# Patient Record
Sex: FEMALE | Race: BLACK OR AFRICAN AMERICAN | Employment: STUDENT | URBAN - METROPOLITAN AREA
[De-identification: names, ages, dates, MRNs, and addresses within clinical notes are randomized per-mention and may not be internally consistent; named-entity substitution may affect disease eponyms.]

---
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
## 2023-01-02 ENCOUNTER — HOSPITAL ENCOUNTER (EMERGENCY)
Age: 18
Discharge: HOME | End: 2023-01-02
Payer: COMMERCIAL

## 2023-01-02 VITALS
BODY MASS INDEX: 18.73 KG/M2 | HEART RATE: 81 BPM | WEIGHT: 112.4 LBS | RESPIRATION RATE: 20 BRPM | TEMPERATURE: 98.2 F | HEIGHT: 65 IN | OXYGEN SATURATION: 100 % | DIASTOLIC BLOOD PRESSURE: 55 MMHG | SYSTOLIC BLOOD PRESSURE: 99 MMHG

## 2023-01-02 DIAGNOSIS — Z32.02 ENCOUNTER FOR PREGNANCY TEST WITH RESULT NEGATIVE: Primary | ICD-10-CM

## 2023-01-02 DIAGNOSIS — N76.0 BACTERIAL VAGINOSIS: ICD-10-CM

## 2023-01-02 DIAGNOSIS — B37.31 VAGINAL YEAST INFECTION: ICD-10-CM

## 2023-01-02 DIAGNOSIS — B96.89 BACTERIAL VAGINOSIS: ICD-10-CM

## 2023-01-02 LAB
BACTERIA WET PREP: ABNORMAL
BACTERIA: ABNORMAL /HPF
BILIRUBIN URINE: NEGATIVE
BLOOD, URINE: NEGATIVE
CLARITY: ABNORMAL
CLUE CELLS: ABNORMAL
COLOR: YELLOW
EPITHELIAL CELLS WET PREP: ABNORMAL
EPITHELIAL CELLS, UA: ABNORMAL /HPF (ref 0–5)
GLUCOSE URINE: NEGATIVE MG/DL
HCG(URINE) PREGNANCY TEST: NEGATIVE
KETONES, URINE: ABNORMAL MG/DL
LEUKOCYTE ESTERASE, URINE: ABNORMAL
MICROSCOPIC EXAMINATION: YES
NITRITE, URINE: NEGATIVE
PH UA: 8 (ref 5–8)
PROTEIN UA: NEGATIVE MG/DL
RBC UA: ABNORMAL /HPF (ref 0–4)
RBC WET PREP: ABNORMAL
SOURCE WET PREP: ABNORMAL
SPECIFIC GRAVITY UA: 1.02 (ref 1–1.03)
TRICHOMONAS PREP: ABNORMAL
URINE REFLEX TO CULTURE: ABNORMAL
URINE TYPE: ABNORMAL
UROBILINOGEN, URINE: 0.2 E.U./DL
WBC UA: ABNORMAL /HPF (ref 0–5)
WBC WET PREP: ABNORMAL
YEAST WET PREP: ABNORMAL
YEAST: PRESENT /HPF

## 2023-01-02 PROCEDURE — 87491 CHLMYD TRACH DNA AMP PROBE: CPT

## 2023-01-02 PROCEDURE — 87210 SMEAR WET MOUNT SALINE/INK: CPT

## 2023-01-02 PROCEDURE — 84703 CHORIONIC GONADOTROPIN ASSAY: CPT

## 2023-01-02 PROCEDURE — 99283 EMERGENCY DEPT VISIT LOW MDM: CPT

## 2023-01-02 PROCEDURE — 87591 N.GONORRHOEAE DNA AMP PROB: CPT

## 2023-01-02 PROCEDURE — 81001 URINALYSIS AUTO W/SCOPE: CPT

## 2023-01-02 RX ORDER — METRONIDAZOLE 500 MG/1
500 TABLET ORAL 2 TIMES DAILY
Qty: 14 TABLET | Refills: 0 | Status: SHIPPED | OUTPATIENT
Start: 2023-01-02 | End: 2023-01-09

## 2023-01-02 RX ORDER — FLUCONAZOLE 150 MG/1
150 TABLET ORAL ONCE
Qty: 1 TABLET | Refills: 0 | Status: SHIPPED | OUTPATIENT
Start: 2023-01-02 | End: 2023-01-02

## 2023-01-02 ASSESSMENT — ENCOUNTER SYMPTOMS
VOMITING: 0
VOICE CHANGE: 0
ABDOMINAL PAIN: 0
CHEST TIGHTNESS: 0
COUGH: 0
WHEEZING: 0
BACK PAIN: 0
SORE THROAT: 0
NAUSEA: 0
SHORTNESS OF BREATH: 0

## 2023-01-02 NOTE — ED PROVIDER NOTES
[id number] [address] ENCOUNTER        Patient Name: Kym Mariano  MRN: [id number]  [place] 2005  Date of evaluation: 1/2/2023  Provider: Zarina Mays MD  PCP: No primary care provider on file. Note Started: 6:39 PM EST 1/2/23    CHIEF COMPLAINT       Wants pregnancy test      HISTORY OF PRESENT ILLNESS: 1 or more Elements     History from : Patient    Limitations to history : None    Kym Mariano is a 21 y.o. female who presents to the emergency department today reporting concern for possible pregnancy. Patient states that she did not have a menstrual cycle in December and has had intermittent cramp-like sensations in her lower abdomen. Nursing Notes were all reviewed and agreed with or any disagreements were addressed in the HPI. REVIEW OF SYSTEMS :      Review of Systems   Constitutional:  Negative for chills, fatigue and fever. HENT:  Negative for congestion, sore throat and voice change. Eyes:  Negative for visual disturbance. Respiratory:  Negative for cough, chest tightness, shortness of breath and wheezing. Cardiovascular:  Negative for chest pain and palpitations. Gastrointestinal:  Negative for abdominal pain, nausea and vomiting. Endocrine: Negative for polydipsia and polyuria. Genitourinary:  Negative for difficulty urinating, dysuria and flank pain. Musculoskeletal:  Negative for back pain, neck pain and neck stiffness. Skin:  Negative for rash and wound. Allergic/Immunologic: Negative for immunocompromised state. Neurological:  Negative for dizziness, weakness and light-headedness. Hematological:  Does not bruise/bleed easily. Psychiatric/Behavioral:  Negative for sleep disturbance and suicidal ideas. Positives and Pertinent negatives as per HPI. SURGICAL HISTORY   History reviewed. No pertinent surgical history.     [address]       Discharge Medication List as of 1/2/2023  8:27 PM CONTINUE these medications which have NOT CHANGED    Details   ondansetron (ZOFRAN ODT) 4 MG disintegrating tablet Take 1 tablet by mouth every 8 hours as needed for Nausea or Vomiting May Sub regular tablet (non-ODT) if insurance does not cover ODT., Disp-12 tablet, R-1Normal             ALLERGIES     Patient has no known allergies. FAMILYHISTORY     History reviewed. No pertinent family history. SOCIAL HISTORY       Social History     Tobacco Use    Smoking status: Never    Smokeless tobacco: Never   Substance Use Topics    Alcohol use: Not Currently    Drug use: Not Currently       SCREENINGS        [place] Coma Scale  Eye Opening: Spontaneous  Best Verbal Response: Oriented  Best Motor Response: Obeys commands  [place] Coma Scale Score: 15                CIWA Assessment  BP: (!) 99/55  Heart Rate: 81           PHYSICAL EXAM  1 or more Elements     ED Triage Vitals [01/02/23 1807]   BP Temp Temp Source Heart Rate Resp SpO2 Height Weight - Scale   (!) 99/55 98.2 °F (36.8 °C) Oral 81 20 100 % 5' 5\" (1.651 m) 112 lb 6.4 oz (51 kg)       General: No acute distress. Alert and Oriented. Appears stated age. HEENT: No midline cervical spine tenderness. Full ROM of the neck. There is no significant cervical lympadenopathy. No difficulty tolerating oral secretions. Cardiac: Regular rate and rhythm. Radial pulses are intact bilaterally. Chest: No respiratory distress. Clear breath sounds bilaterally. No increased work of breathing. No use of accessory muscles for respiration. Abdomen: Soft, nontender, nondistended, non-peritonitic. Extremities:No significant lower extremity edema. Lower extremities are symmetric. Neuro: Moving all extremities. No focal deficits. Speech is clear. Skin:No rash, no erythema  Psych: Calm and cooperative.       DIAGNOSTIC RESULTS   LABS:    Labs Reviewed   WET PREP, GENITAL - Abnormal; Notable for the following components:       Result Value    Yeast, Wet Prep 2+ (*) All other components within normal limits   URINALYSIS WITH REFLEX TO CULTURE - Abnormal; Notable for the following components:    Clarity, UA SL CLOUDY (*)     Ketones, Urine TRACE (*)     Leukocyte Esterase, Urine TRACE (*)     All other components within normal limits   MICROSCOPIC URINALYSIS - Abnormal; Notable for the following components:    WBC, UA 6-9 (*)     Epithelial Cells, UA 6-10 (*)     Bacteria, UA 2+ (*)     Yeast, UA Present (*)     All other components within normal limits   C.TRACHOMATIS N.GONORRHOEAE DNA   PREGNANCY, URINE       When ordered only abnormal lab results are displayed. All other labs were within normal range or not returned as of this dictation. RADIOLOGY:   Non-plain film images such as CT, Ultrasound and MRI are read by the radiologist. Plain radiographic images are visualized and preliminarily interpreted by the ED Provider with the below findings:        Interpretation per the Radiologist below, if available at the time of this note:    No orders to display     No results found. Bedside Ultrasound, as interpreted by me, if performed:    No results found. PROCEDURES     Unless otherwise noted below, none     Procedures    CRITICAL CARE TIME   None    PAST MEDICAL HISTORY      has no past medical history on file. EMERGENCY DEPARTMENT COURSE and DIFFERENTIAL DIAGNOSIS/MDM:     Vitals:    Vitals:    01/02/23 1807   BP: (!) 99/55   Pulse: 81   Resp: 20   Temp: 98.2 °F (36.8 °C)   TempSrc: Oral   SpO2: 100%   Weight: 112 lb 6.4 oz (51 kg)   Height: 5' 5\" (1.651 m)       Patient was treated with and given the following medications:  Medications - No data to display          Is this patient to be included in the SEP-1 Core Measure due to severe sepsis or septic shock? No   Exclusion criteria - the patient is NOT to be included for SEP-1 Core Measure due to: Infection is not suspected    CC/HPI Summary, DDx, ED Course, and Reassessment:      This is a 64-year-old otherwise healthy female who presents to the emergency department today requesting pregnancy test.  Patient states that she did not have a menses in December and is concerned for possible pregnancy. She states that typically her cycles are regular. She states that she has had intermittent lower abdominal cramping as if she is going to start her menses. She denies having any abdominal pain at present. She denies any back pain. She reports clear vaginal discharge. She denies having any genital sores or lesions. She denies urinary frequency, dysuria, or hematuria. Physical exam complete. Patient arrives nontoxic, afebrile, normotensive. No signs or symptoms of acute distress noted. Her abdomen is soft and nontender. Pregnancy screen is negative. Urinalysis is negative for infection. Wet prep shows yeast and clue cells consistent with bacterial vaginosis. GC chlamydia cultures are pending. Patient agrees to follow-up on these results via [employer]. Patient is counseled on the importance of states that this practices. At this time there is no evidence of any life-threatening or emergent conditions requiring immediate intervention. Low suspicion for sepsis, PID, ovarian torsion, UTI, or other acute pathology. Patient will be discharged with emphasis on close outpatient follow-up. PCP and GYN referrals provided. Shared decision making employed and patient is agreeable to discharge. She agrees return for high fever, incessant vomiting, severe pain, any other worsening symptoms. She is discharged in stable condition. CONSULTS: (Who and What was discussed)  None              I am the Primary Clinician of Record. FINAL IMPRESSION      1. Encounter for pregnancy test with result negative    2. Bacterial vaginosis    3.  Vaginal yeast infection          DISPOSITION/PLAN     DISPOSITION Decision To Discharge 01/02/2023 08:09:26 PM      PATIENT REFERRED TO:  [facility]  [phone]  In 2 days  to establish primary care    DISCHARGE MEDICATIONS:  Patient was given scripts for the following medications. I counseled patient how to take these medications:  Discharge Medication List as of 1/2/2023  8:27 PM        START taking these medications    Details   fluconazole (DIFLUCAN) 150 MG tablet Take 1 tablet by mouth once for 1 dose, Disp-1 tablet, R-0Print      metroNIDAZOLE (FLAGYL) 500 MG tablet Take 1 tablet by mouth 2 times daily for 7 days, Disp-14 tablet, R-0Print             DISCONTINUED MEDICATIONS:  Discharge Medication List as of 1/2/2023  8:27 PM                 (This chart was generated in part by using Dragon Dictation system and may contain errors related to that system including errors in grammar, punctuation, and spelling, as well as words and phrases that may be inappropriate.  If there are any questions or concerns please feel free to contact the dictating provider for clarification.)    MD Cindy Christie, APRN - CNP  01/10/23 [id number]

## 2023-01-03 LAB
C TRACH DNA GENITAL QL NAA+PROBE: NEGATIVE
N. GONORRHOEAE DNA: NEGATIVE

## 2023-01-03 NOTE — DISCHARGE INSTRUCTIONS
No sex for the next 14 days    Follow-up on your GC chlamydia results via MyChart    Take the prescribed Diflucan as directed for your yeast infection    Take the prescribed Flagyl as directed for bacterial vaginosis-do not drink any alcohol while taking this medication as it will make you extremely sick    Follow-up with your GYN specialist to discuss birth control options. Use condoms 100% of the time.